# Patient Record
Sex: FEMALE | Race: OTHER | HISPANIC OR LATINO | URBAN - METROPOLITAN AREA
[De-identification: names, ages, dates, MRNs, and addresses within clinical notes are randomized per-mention and may not be internally consistent; named-entity substitution may affect disease eponyms.]

---

## 2022-11-16 ENCOUNTER — APPOINTMENT (RX ONLY)
Dept: URBAN - METROPOLITAN AREA CLINIC 95 | Facility: CLINIC | Age: 32
Setting detail: DERMATOLOGY
End: 2022-11-16

## 2022-11-16 DIAGNOSIS — L52 ERYTHEMA NODOSUM: ICD-10-CM

## 2022-11-16 DIAGNOSIS — L30.1 DYSHIDROSIS [POMPHOLYX]: ICD-10-CM | Status: UNCHANGED

## 2022-11-16 PROCEDURE — ? PRESCRIPTION MEDICATION MANAGEMENT

## 2022-11-16 PROCEDURE — 99214 OFFICE O/P EST MOD 30 MIN: CPT

## 2022-11-16 PROCEDURE — ? COUNSELING

## 2022-11-16 ASSESSMENT — LOCATION DETAILED DESCRIPTION DERM
LOCATION DETAILED: LEFT DISTAL PRETIBIAL REGION
LOCATION DETAILED: RIGHT DISTAL PRETIBIAL REGION
LOCATION DETAILED: RIGHT DISTAL CALF
LOCATION DETAILED: LEFT ULNAR DORSAL HAND
LOCATION DETAILED: LEFT PROXIMAL MEDIAL CALF
LOCATION DETAILED: LEFT DISTAL CALF
LOCATION DETAILED: RIGHT PROXIMAL DORSAL RING FINGER
LOCATION DETAILED: LEFT PROXIMAL DORSAL MIDDLE FINGER
LOCATION DETAILED: LEFT DISTAL PRETIBIAL REGION

## 2022-11-16 ASSESSMENT — LOCATION SIMPLE DESCRIPTION DERM
LOCATION SIMPLE: RIGHT PRETIBIAL REGION
LOCATION SIMPLE: LEFT HAND
LOCATION SIMPLE: RIGHT CALF
LOCATION SIMPLE: LEFT MIDDLE FINGER
LOCATION SIMPLE: LEFT PRETIBIAL REGION
LOCATION SIMPLE: RIGHT RING FINGER
LOCATION SIMPLE: LEFT PRETIBIAL REGION
LOCATION SIMPLE: LEFT CALF
LOCATION SIMPLE: LEFT CALF

## 2022-11-16 ASSESSMENT — LOCATION ZONE DERM
LOCATION ZONE: FINGER
LOCATION ZONE: LEG
LOCATION ZONE: HAND
LOCATION ZONE: LEG

## 2022-11-16 NOTE — PROCEDURE: PRESCRIPTION MEDICATION MANAGEMENT
Detail Level: Simple
Render In Strict Bullet Format?: No
Continue Regimen: Triamcinolone 0.1% Oint apply to hands bid x 2 weeks on 1 weeks off-- pt has medication at home prescribed at recent past visit

## 2022-11-16 NOTE — HPI: RASH
Is The Patient Presenting As Previously Scheduled?: Yes
How Severe Is Your Rash?: moderate
Is This A New Presentation, Or A Follow-Up?: Follow Up Rash
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 Some lesions appear to be resolving, though others persist. Favored to be related to recent pregnancy with subsequent unfortunate miscarriage. She is also here today for pruritic eruption of digits on hands with small tense blisters. She has started applying TAC ointment that was prescribed at last visit about 3 days ago which has been helpful thus far.

## 2022-11-16 NOTE — PROCEDURE: COUNSELING
Detail Level: Simple
Patient Specific Counseling (Will Not Stick From Patient To Patient): PT had recent pregnancy loss which occurred near the time of the development of these lesions. She continues without symptoms, so we are monitoring for resolution over the next ~ 6-8 weeks. Discussed treatment options should symptoms develop.